# Patient Record
Sex: FEMALE
[De-identification: names, ages, dates, MRNs, and addresses within clinical notes are randomized per-mention and may not be internally consistent; named-entity substitution may affect disease eponyms.]

---

## 2022-07-07 ENCOUNTER — APPOINTMENT (OUTPATIENT)
Dept: RHEUMATOLOGY | Facility: CLINIC | Age: 77
End: 2022-07-07

## 2022-07-07 VITALS
RESPIRATION RATE: 18 BRPM | BODY MASS INDEX: 35.03 KG/M2 | HEIGHT: 66 IN | WEIGHT: 218 LBS | TEMPERATURE: 97 F | HEART RATE: 64 BPM | SYSTOLIC BLOOD PRESSURE: 129 MMHG | DIASTOLIC BLOOD PRESSURE: 85 MMHG | OXYGEN SATURATION: 97 %

## 2022-07-07 PROCEDURE — 99205 OFFICE O/P NEW HI 60 MIN: CPT

## 2022-07-07 NOTE — ASSESSMENT
[FreeTextEntry1] : 78 y/o female referred to rheumatology for RA.\par Pt was diagnosed with RA around 2016 with joint pains in shoulders, ankles, feet, feet.\par Pt was on PDN and MTX in the past but stopped when started on injectables. Pt has been on Actemra for the last 4-5 years.\par Pt had R knee patella was removed at age 21 for recurrent knee swelling, which has since resolved with surgery.\par Pt has been following with Dr. Courtney and he stopped taking her insurance. Pt's Actemra was stopped about 1 month ago due to no refills. Pt has been noticing more pains in the shoulders and R ankle swelling. Pt was seen by another rheumatologist but was told that she was not seropositive.\par Pt also follows with hematology for iron overload, s/p phlebotomy x 1.\par Pt follows with pain management for back pain and receives multiple injections.\par \par Patient reportedly has seronegative RA, failed MTX, and has been on Actemra for the last 4-5 years with good control. Pt wonders if she needs to be continued on injectable medications as she is afraid of side effects and whether she needs this medication for a seronegative disease.\par I discussed with the patient that seronegativity can indicate less aggressive disease, but other factors, including disease activity, bone damage, etc. are also important in determining medication choices. Given that MTX did not fully control her symptoms but Actemra has been for the last 4 years, I do feel that she should be continuing on Actemra and not changing medication that is keeping her disease stable.\par \par - Advised to send me lab results from pt's rheumatologist over the last 2 years\par - If Hep B/C and TB testing negative, will renew pt's Actemra. Side effects of Actemra were discussed with the pt in detail including increased risk of infection, GI perforation, hepatotoxicity, neutropenia, thrombocytopenia. Advised pt to seek medical care ASAP if he/she has an infection and advised to stop the medication until infection resolves. This is a high-risk therapy requiring intense monitoring for toxicity. Will evaluate with frequent monitoring of CBC, LFTs, lipid panel.\par - Obtain XR of b/l hands, ankles, feet to evaluate for erosions\par - Will call pt to let her know if I am renewing Actemra. Will discuss my impression of pt's disease activity and prognosis based on above data and talk about risk and benefits of keeping or changing Actemra to a different medication. RTC 2 months for follow up

## 2022-07-07 NOTE — HISTORY OF PRESENT ILLNESS
Final Anesthesia Post-op Assessment    Patient: Jennifer Holm  Procedure(s) Performed: COLONOSCOPY  Anesthesia type: MAC    Vitals Value Taken Time   Temp 36 01/21/21 1113   Pulse 60 01/21/21 1113   Resp 18 01/21/21 1113   SpO2 99 % 01/21/21 1113   BP 91/51 01/21/21 1113   Vitals shown include unvalidated device data.      Patient Location: PACU Phase 1  Post-op Vital Signs:stable  Level of Consciousness: awake, alert, participates in exam and oriented  Respiratory Status: spontaneous ventilation and nasal cannula  Cardiovascular stable  Hydration: euvolemic  Pain Management: adequately controlled  Handoff: Handoff to receiving nurse was performed and questions were answered  Vomiting: none   Nausea: None  Airway Patency:patent  Post-op Assessment: awake, alert, appropriately conversant, or baseline, no complications, patient tolerated procedure well with no complications, evidence of recall, dentition within defined limits, moving all extremities and No Corneal Abrasion      No complications documented.    [FreeTextEntry1] : 78 y/o female referred to rheumatology for RA.\par Pt was diagnosed with RA around 2016 with joint pains in shoulders, ankles, feet, feet.\par Pt was on PDN and MTX in the past but stopped when started on injectables. Pt has been on Actemra for the last 4-5 years.\par Pt had R knee patella was removed at age 21 for recurrent knee swelling, which has since resolved with surgery.\par Pt has been following with Dr. Courtney and he stopped taking her insurance. Pt's Actemra was stopped about 1 month ago due to no refills. Pt has been noticing more pains in the shoulders and R ankle swelling. Pt was seen by another rheumatologist but was told that she was not seropositive.\par Pt also follows with hematology for iron overload, s/p phlebotomy x 1.\par Pt follows with pain management for back pain and receives multiple injections.\par

## 2022-07-29 ENCOUNTER — NON-APPOINTMENT (OUTPATIENT)
Age: 77
End: 2022-07-29

## 2022-09-07 ENCOUNTER — APPOINTMENT (OUTPATIENT)
Dept: RHEUMATOLOGY | Facility: CLINIC | Age: 77
End: 2022-09-07

## 2022-09-07 VITALS
OXYGEN SATURATION: 96 % | SYSTOLIC BLOOD PRESSURE: 131 MMHG | BODY MASS INDEX: 32.14 KG/M2 | HEIGHT: 66 IN | DIASTOLIC BLOOD PRESSURE: 80 MMHG | HEART RATE: 52 BPM | WEIGHT: 200 LBS | TEMPERATURE: 96.9 F

## 2022-09-07 PROCEDURE — 99214 OFFICE O/P EST MOD 30 MIN: CPT

## 2022-09-07 NOTE — HISTORY OF PRESENT ILLNESS
[FreeTextEntry1] : HISTORY: \par 78 y/o female presents as follow up for RA. \par Pt was diagnosed with RA around 2016 with joint pains in shoulders, ankles, feet. Pt has positive CCP. \par Pt was on PDN and MTX in the past but stopped when started on biologics. Pt has been on Actemra for the last 4-5 years. \par Pt had R knee patella was removed at age 21 for recurrent knee swelling, which has since resolved with surgery. \par Pt has been following with Dr. Courtney and he stopped taking her insurance. Pt's Actemra was stopped about 1 month ago as pt’s symptoms not clearly improved with Actemra and pt wanted to see if she can be off medications. Pt has been noticing more pains in the shoulders and R ankle swelling. Pt was seen by another rheumatologist but was told that she was not seropositive. \par Pt also follows with hematology for iron overload, s/p phlebotomy x 1. \par Pt follows with pain management for back pain and receives multiple injections. \par \par Patient reportedly has seronegative RA, failed MTX, and has been on Actemra for the last 4-5 years with good control. Pt wonders if she needs to be continued on injectable medications as she is afraid of side effects and whether she needs this medication for a seronegative disease. \par \par I discussed with the patient that seronegativity can indicate less aggressive disease, but other factors, including disease activity, bone damage, etc. are also important in determining medication choices. Given that MTX did not fully control her symptoms but Actemra has been for the last 4 years, I do feel that she should be continuing on Actemra and not changing medication that is keeping her disease stable.\par \par INTERVAL HISTORY:\par Pt was restarted on Actemra by me about 2-3 weeks ago after negative safety labs. Pt denies any side effects. Denies any flares of joint pain or inflammation. 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

## 2022-09-07 NOTE — ASSESSMENT
[FreeTextEntry1] : 78 y/o female presents as follow up for RA. \par Pt was diagnosed with RA around 2016 with joint pains in shoulders, ankles, feet. Pt has positive CCP. \par Pt was on PDN and MTX in the past but stopped when started on biologics. Pt has been on Actemra for the last 4-5 years. \par Pt had R knee patella was removed at age 21 for recurrent knee swelling, which has since resolved with surgery. \par Pt has been following with Dr. Courtney and he stopped taking her insurance. Pt's Actemra was stopped about 1 month ago as pt’s symptoms not clearly improved with Actemra and pt wanted to see if she can be off medications. Pt has been noticing more pains in the shoulders and R ankle swelling. Pt was seen by another rheumatologist but was told that she was not seropositive. \par Pt also follows with hematology for iron overload, s/p phlebotomy x 1. \par Pt follows with pain management for back pain and receives multiple injections. \par \par Workup by me showed positive RF and CCP but normal inflammatory markers.\par Patient has RA, failed MTX, and has been on Actemra for the last 4-5 years with good control. Pt wonders if she needs to be continued on injectable medications as she is afraid of side effects. Given that MTX did not fully control her symptoms but Actemra has been for the last 4 years, I do feel that she should be continuing on Actemra and not changing medication that is keeping her disease stable.\par \par Pt was restarted on Actemra by me in 8/2022 without any major side effects.\par \par - Will obtain Actemra safety labs on next visit\par - c/w Actemra SQ. Side effects of Actemra were discussed with the pt in detail including increased risk of infection, GI perforation, hepatotoxicity, neutropenia, thrombocytopenia. Advised pt to seek medical care ASAP if he/she has an infection and advised to stop the medication until infection resolves. This is a high-risk therapy requiring intense monitoring for toxicity. Will evaluate with frequent monitoring of CBC, LFTs, lipid panel (every 3 months). \par - RTC 3 months for follow up\par \par

## 2022-12-23 ENCOUNTER — APPOINTMENT (OUTPATIENT)
Dept: RHEUMATOLOGY | Facility: CLINIC | Age: 77
End: 2022-12-23

## 2023-01-11 ENCOUNTER — NON-APPOINTMENT (OUTPATIENT)
Age: 78
End: 2023-01-11

## 2023-01-30 ENCOUNTER — APPOINTMENT (OUTPATIENT)
Dept: RHEUMATOLOGY | Facility: CLINIC | Age: 78
End: 2023-01-30
Payer: MEDICARE

## 2023-01-30 VITALS
DIASTOLIC BLOOD PRESSURE: 83 MMHG | HEART RATE: 75 BPM | BODY MASS INDEX: 32.14 KG/M2 | SYSTOLIC BLOOD PRESSURE: 128 MMHG | TEMPERATURE: 97.7 F | OXYGEN SATURATION: 96 % | WEIGHT: 200 LBS | HEIGHT: 66 IN

## 2023-01-30 PROCEDURE — 99214 OFFICE O/P EST MOD 30 MIN: CPT

## 2023-01-30 RX ORDER — GABAPENTIN 100 MG/1
100 CAPSULE ORAL
Qty: 90 | Refills: 1 | Status: COMPLETED | COMMUNITY
Start: 2022-10-05 | End: 2023-01-30

## 2023-01-30 NOTE — HISTORY OF PRESENT ILLNESS
[FreeTextEntry1] : HISTORY: \par 76 y/o female presents as follow up for RA.  \par Pt was diagnosed with RA around 2016 with joint pains in shoulders, ankles, feet. Pt has positive CCP.  \par Pt was on PDN and MTX in the past but stopped when started on biologics. Pt has been on Actemra for the last 4-5 years.  \par Pt had R knee patella was removed at age 21 for recurrent knee swelling, which has since resolved with surgery. \par \par Pt has been following with Dr. Courtney and he stopped taking her insurance. Pt's Actemra was stopped about 1 month ago as pt’s symptoms not clearly improved with Actemra and pt wanted to see if she can be off medications. Pt has been noticing more pains in the shoulders and R ankle swelling. Pt was seen by another rheumatologist but was told that she was not seropositive.  \par Pt also follows with hematology for iron overload, s/p phlebotomy x 1.  \par Pt follows with pain management for back pain and receives multiple injections.  \par \par Workup by me showed positive RF and CCP but normal inflammatory markers. \par Patient has RA, failed MTX, and has been on Actemra for the last 4-5 years with good control. Pt wonders if she needs to be continued on injectable medications as she is afraid of side effects. Given that MTX did not fully control her symptoms but Actemra has been for the last 4 years, I do feel that she should be continuing on Actemra and not changing medication that is keeping her disease stable. \par \par Pt was restarted on Actemra by me in 8/2022 without any major side effects. \par \par INTERVAL HISTORY: \par Pt had COVID infection in 12/2022 and held Actemra and celebrex. Actemra and celebrex was restarted after about 3 weeks after her COVID symptoms near resolved. Pt took Paxlovid for the COVID infection which gave her a lot of side effects. Pt continues to have some fatigue but is stlowly resolving. 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

## 2023-01-30 NOTE — ASSESSMENT
[FreeTextEntry1] : 76 y/o female presents as follow up for RA.  \par Pt was diagnosed with RA around 2016 with joint pains in shoulders, ankles, feet. Pt has positive CCP.  \par Pt was on PDN and MTX in the past but stopped when started on biologics. Pt has been on Actemra for the last 4-5 years.  \par Pt had R knee patella was removed at age 21 for recurrent knee swelling, which has since resolved with surgery. \par \par Pt has been following with Dr. Courtney and he stopped taking her insurance. Pt's Actemra was stopped about 1 month ago as pt’s symptoms not clearly improved with Actemra and pt wanted to see if she can be off medications. Pt has been noticing more pains in the shoulders and R ankle swelling. Pt was seen by another rheumatologist but was told that she was not seropositive.  \par Pt also follows with hematology for iron overload, s/p phlebotomy x 1.  \par Pt follows with pain management for back pain and receives multiple injections.  \par \par Workup by me showed positive RF and CCP but normal inflammatory markers. \par Patient has RA, failed MTX, and has been on Actemra for the last 4-5 years with good control. Pt wonders if she needs to be continued on injectable medications as she is afraid of side effects. Given that MTX did not fully control her symptoms but Actemra has been for the last 4 years, I do feel that she should be continuing on Actemra and not changing medication that is keeping her disease stable. \par \par Pt was restarted on Actemra by me in 8/2022 without any major side effects.\par Pt held Actemra and celebrex due to COVID infection but is now back on both. Gabapentin was stsopped due to lack of help with pain and made pt feel woozy.\par \par - Obtain Actemra safety labs with next labwork with hematologist\par - c/w Actemra SQ. Side effects of Actemra were discussed with the pt in detail including increased risk of infection, GI perforation, hepatotoxicity, neutropenia, thrombocytopenia. Advised pt to seek medical care ASAP if he/she has an infection and advised to stop the medication until infection resolves. This is a high-risk therapy requiring intense monitoring for toxicity. Will evaluate with frequent monitoring of CBC, LFTs, lipid panel (every 3 months).  \par - c/w celebrex 200mg PO BID PRN (pt takes about once daily). I advised that the NSAID should be taken with food.  In addition while taking the prescribed NSAID, no over the counter or other NSAIDs should be used, such as ibuprofen (Motrin or Advil) or naproxen (Aleve) as this can cause stomach upset or other side effects.  If needed for fever or breakthrough pain Tylenol can be used.\par - RTC 3 months for follow up\par

## 2023-05-01 ENCOUNTER — APPOINTMENT (OUTPATIENT)
Dept: RHEUMATOLOGY | Facility: CLINIC | Age: 78
End: 2023-05-01
Payer: MEDICARE

## 2023-05-01 VITALS
HEART RATE: 54 BPM | HEIGHT: 66 IN | SYSTOLIC BLOOD PRESSURE: 113 MMHG | WEIGHT: 200 LBS | TEMPERATURE: 97.4 F | DIASTOLIC BLOOD PRESSURE: 78 MMHG | OXYGEN SATURATION: 97 % | BODY MASS INDEX: 32.14 KG/M2

## 2023-05-01 PROCEDURE — 99214 OFFICE O/P EST MOD 30 MIN: CPT

## 2023-05-01 NOTE — HISTORY OF PRESENT ILLNESS
[FreeTextEntry1] : HISTORY: \par 79 y/o female presents as follow up for RA.  \par Pt was diagnosed with RA around 2016 with joint pains in shoulders, ankles, feet. Pt has positive CCP.  \par Pt was on PDN and MTX in the past but stopped when started on biologics. Pt has been on Actemra for the last 4-5 years.  \par Pt had R knee patella was removed at age 21 for recurrent knee swelling, which has since resolved with surgery.  \par Pt has been following with Dr. Courtney and he stopped taking her insurance. Pt's Actemra was stopped about 1 month ago as pt’s symptoms not clearly improved with Actemra and pt wanted to see if she can be off medications. Pt has been noticing more pains in the shoulders and R ankle swelling. Pt was seen by another rheumatologist but was told that she was not seropositive.  \par Pt also follows with hematology for iron overload, s/p phlebotomy x 1.  \par Pt follows with pain management for back pain and receives multiple injections.  \par \par Workup by me showed positive RF and CCP but normal inflammatory markers.  \par Patient has RA, failed MTX, and has been on Actemra for the last 4-5 years with good control. Pt wonders if she needs to be continued on injectable medications as she is afraid of side effects. Given that MTX did not fully control her symptoms but Actemra has been for the last 4 years, I do feel that she should be continuing on Actemra and not changing medication that is keeping her disease stable.  \par \par Pt was restarted on Actemra by me in 8/2022 without any major side effects. Gabapentin was stopped due to lack of help with pain and made pt feel woozy. \par \par INTERVAL HISTORY: \par Pt had COVID infection in 12/2022 and since then, pt does not feel as well and has lost 10-15 lbs. Pt is continuing to follow with cardiologist for workup. Pt also had first episode of panic attack and has been started on anxiolytic with improvement. 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 \par

## 2023-05-01 NOTE — ASSESSMENT
[FreeTextEntry1] : 79 y/o female presents as follow up for RA.  \par Pt was diagnosed with RA around 2016 with joint pains in shoulders, ankles, feet. Pt has positive CCP.  \par Pt was on PDN and MTX in the past but stopped when started on biologics. Pt has been on Actemra for the last 4-5 years.  \par Pt had R knee patella was removed at age 21 for recurrent knee swelling, which has since resolved with surgery.  \par Pt has been following with Dr. Courtney and he stopped taking her insurance. Pt's Actemra was stopped about 1 month ago as pt’s symptoms not clearly improved with Actemra and pt wanted to see if she can be off medications. Pt has been noticing more pains in the shoulders and R ankle swelling. Pt was seen by another rheumatologist but was told that she was not seropositive.  \par Pt also follows with hematology for iron overload, s/p phlebotomy x 1.  \par Pt follows with pain management for back pain and receives multiple injections.  \par \par Workup by me showed positive RF and CCP but normal inflammatory markers.  \par Patient has RA, failed MTX, and has been on Actemra for the last 4-5 years with good control. Pt wonders if she needs to be continued on injectable medications as she is afraid of side effects. Given that MTX did not fully control her symptoms but Actemra has been for the last 4 years, I do feel that she should be continuing on Actemra and not changing medication that is keeping her disease stable.  \par \par Pt was restarted on Actemra by me in 8/2022 without any major side effects and good control of her RA. Gabapentin was stopped due to lack of help with pain and made pt feel woozy.\par \par Pt has some non-specific symptoms including malaise, weight loss, anxiety since COVD infection in 12/2022.\par \par - Pt states she just had labwork by hematologist - advised to send labwork ASAP, as last safety labwork for Actemra was in 8/2022. \par - c/w Actemra SQ. Side effects of Actemra were discussed with the pt in detail including increased risk of infection, GI perforation, hepatotoxicity, neutropenia, thrombocytopenia. Advised pt to seek medical care ASAP if he/she has an infection and advised to stop the medication until infection resolves. This is a high-risk therapy requiring intense monitoring for toxicity. Will evaluate with frequent monitoring of CBC, LFTs, lipid panel (every 3 months).  \par - c/w celebrex 200mg PO BID PRN (pt takes about once daily). I advised that the NSAID should be taken with food. In addition while taking the prescribed NSAID, no over the counter or other NSAIDs should be used, such as ibuprofen (Motrin or Advil) or naproxen (Aleve) as this can cause stomach upset or other side effects. If needed for fever or breakthrough pain Tylenol can be used. \par - Last Hep B/C, TB negative 8/2022. Will obtain on next visit if not repeated by then\par - RTC 3 months for follow up \par

## 2023-06-05 ENCOUNTER — OFFICE (OUTPATIENT)
Dept: URBAN - METROPOLITAN AREA CLINIC 1 | Facility: CLINIC | Age: 78
Setting detail: OPHTHALMOLOGY
End: 2023-06-05
Payer: MEDICARE

## 2023-06-05 DIAGNOSIS — H35.372: ICD-10-CM

## 2023-06-05 DIAGNOSIS — H43.392: ICD-10-CM

## 2023-06-05 DIAGNOSIS — H18.831: ICD-10-CM

## 2023-06-05 DIAGNOSIS — Z96.1: ICD-10-CM

## 2023-06-05 DIAGNOSIS — H25.89: ICD-10-CM

## 2023-06-05 DIAGNOSIS — H43.811: ICD-10-CM

## 2023-06-05 DIAGNOSIS — H40.013: ICD-10-CM

## 2023-06-05 DIAGNOSIS — H43.822: ICD-10-CM

## 2023-06-05 DIAGNOSIS — H26.493: ICD-10-CM

## 2023-06-05 DIAGNOSIS — H16.223: ICD-10-CM

## 2023-06-05 DIAGNOSIS — H18.503: ICD-10-CM

## 2023-06-05 PROCEDURE — 92014 COMPRE OPH EXAM EST PT 1/>: CPT | Performed by: OPHTHALMOLOGY

## 2023-06-05 PROCEDURE — 92250 FUNDUS PHOTOGRAPHY W/I&R: CPT | Performed by: OPHTHALMOLOGY

## 2023-06-05 ASSESSMENT — SPHEQUIV_DERIVED
OD_SPHEQUIV: -3.125
OD_SPHEQUIV: -3.125
OS_SPHEQUIV: 0.25
OS_SPHEQUIV: 0.25

## 2023-06-05 ASSESSMENT — REFRACTION_CURRENTRX
OD_AXIS: 081
OS_CYLINDER: 0.00
OD_CYLINDER: -0.50
OD_VPRISM_DIRECTION: SV
OD_OVR_VA: 20/
OS_VPRISM_DIRECTION: SV
OS_SPHERE: -0.25
OD_SPHERE: -2.00
OS_AXIS: 000
OS_OVR_VA: 20/

## 2023-06-05 ASSESSMENT — AXIALLENGTH_DERIVED
OD_AL: 25.2382
OD_AL: 25.2382
OS_AL: 23.912
OS_AL: 23.912

## 2023-06-05 ASSESSMENT — KERATOMETRY
METHOD_AUTO_MANUAL: AUTO
OD_K2POWER_DIOPTERS: 43.00
OD_AXISANGLE_DEGREES: 102
OD_K1POWER_DIOPTERS: 42.25
OS_AXISANGLE_DEGREES: 079
OS_K2POWER_DIOPTERS: 42.75
OS_K1POWER_DIOPTERS: 42.00

## 2023-06-05 ASSESSMENT — REFRACTION_MANIFEST
OD_CYLINDER: -1.25
OD_SPHERE: -2.50
OS_SPHERE: +0.50
OS_ADD: +2.50
OD_AXIS: 106
OS_AXIS: 076
OS_VA1: 20/NI
OD_ADD: +2.50
OD_VA1: 20/20
OS_CYLINDER: -0.50

## 2023-06-05 ASSESSMENT — REFRACTION_AUTOREFRACTION
OD_AXIS: 106
OS_CYLINDER: -0.50
OS_SPHERE: +0.50
OD_SPHERE: -2.50
OD_CYLINDER: -1.25
OS_AXIS: 076

## 2023-06-05 ASSESSMENT — DECREASING TEAR LAKE - SEVERITY SCORE
OS_DEC_TEARLAKE: 1+
OD_DEC_TEARLAKE: 1+

## 2023-06-05 ASSESSMENT — PACHYMETRY
OS_CT_UM: 577
OD_CT_CORRECTION: -2
OS_CT_CORRECTION: -2
OD_CT_UM: 576

## 2023-06-05 ASSESSMENT — CONFRONTATIONAL VISUAL FIELD TEST (CVF)
OD_FINDINGS: FULL
OS_FINDINGS: FULL

## 2023-06-05 ASSESSMENT — VISUAL ACUITY
OD_BCVA: 20/25+2
OS_BCVA: 20/25

## 2023-06-05 ASSESSMENT — TONOMETRY
OD_IOP_MMHG: 15
OS_IOP_MMHG: 13

## 2023-08-01 ENCOUNTER — APPOINTMENT (OUTPATIENT)
Dept: RHEUMATOLOGY | Facility: CLINIC | Age: 78
End: 2023-08-01
Payer: MEDICARE

## 2023-08-01 PROCEDURE — 99214 OFFICE O/P EST MOD 30 MIN: CPT

## 2023-08-01 NOTE — PHYSICAL EXAM
[TextEntry] : GENERAL: Appears in no acute distress HEENT: EOMI, PERRLA. No conjunctival erythema. Moist mucous membranes. No nasopharyngeal ulcers NECK: Supple, no cervical lymphadenopathy, no thyromegaly CARDIOVASCULAR: RRR. S1, S2 auscultated. No murmurs or rubs. PULMONARY: Clear to auscultation b/l, no wheezes, rales, or crackles ABDOMINAL: Soft, nontender, nondistended. Bowel sounds present. No organomegaly. MSK: No active synovitis No tenderness to palpation of joints No chronic RA deformities. Normal ROM of neck, back, b/l upper and lower extremities. SKIN: No lesions or rashes NEURO: No focal deficits. PSYCH: AAOx3. Normal affect and thought process.

## 2023-08-01 NOTE — ASSESSMENT
[FreeTextEntry1] : 79 y/o female presents as follow up for RA.   Pt was diagnosed with RA around 2016 with joint pains in shoulders, ankles, feet. Pt has positive CCP.   Pt was on PDN and MTX in the past but stopped when started on biologics. Pt has been on Actemra for the last 4-5 years.   Pt had R knee patella was removed at age 21 for recurrent knee swelling, which has since resolved with surgery.   Pt has been following with Dr. Courtney and he stopped taking her insurance. Pt's Actemra was stopped about 1 month ago as pt's symptoms not clearly improved with Actemra and pt wanted to see if she can be off medications. Pt has been noticing more pains in the shoulders and R ankle swelling. Pt was seen by another rheumatologist but was told that she was not seropositive.   Pt also follows with hematology for iron overload, s/p phlebotomy x 1.   Pt follows with pain management for back pain and receives multiple injections.    Workup by me showed positive RF and CCP but normal inflammatory markers.   Patient has RA, failed MTX, and has been on Actemra for the last 4-5 years with good control. Pt wonders if she needs to be continued on injectable medications as she is afraid of side effects. Given that MTX did not fully control her symptoms but Actemra has been for the last 4 years, I do feel that she should be continuing on Actemra and not changing medication that is keeping her disease stable.    Pt was restarted on Actemra by me in 8/2022 without any major side effects and good control of her RA. Gabapentin was stopped due to lack of help with pain and made pt feel woozy.   Pt has some non-specific symptoms including malaise, weight loss, anxiety since COVD infection in 12/2022.   - Obtain labwork for medication safety and disease activity, annual Hep B/C, Quantiferon TB - c/w Actemra SQ. Side effects of Actemra were discussed with the pt in detail including increased risk of infection, GI perforation, hepatotoxicity, neutropenia, thrombocytopenia. Advised pt to seek medical care ASAP if he/she has an infection and advised to stop the medication until infection resolves. This is a high-risk therapy requiring intense monitoring for toxicity. Will evaluate with frequent monitoring of CBC, LFTs, lipid panel (every 3 months). - c/w celebrex 200mg PO BID PRN (pt takes about once daily). I advised that the NSAID should be taken with food. In addition while taking the prescribed NSAID, no over the counter or other NSAIDs should be used, such as ibuprofen (Motrin or Advil) or naproxen (Aleve) as this can cause stomach upset or other side effects. If needed for fever or breakthrough pain Tylenol can be used. - Last Hep B/C, TB negative 8/2022. Repeating today. - RTC 3 months for follow up

## 2023-08-01 NOTE — HISTORY OF PRESENT ILLNESS
[FreeTextEntry1] : HISTORY:  77 y/o female presents as follow up for RA.   Pt was diagnosed with RA around 2016 with joint pains in shoulders, ankles, feet. Pt has positive CCP.   Pt was on PDN and MTX in the past but stopped when started on biologics. Pt has been on Actemra for the last 4-5 years.   Pt had R knee patella was removed at age 21 for recurrent knee swelling, which has since resolved with surgery.   Pt has been following with Dr. Courtney and he stopped taking her insurance. Pt's Actemra was stopped about 1 month ago as pt's symptoms not clearly improved with Actemra and pt wanted to see if she can be off medications. Pt has been noticing more pains in the shoulders and R ankle swelling. Pt was seen by another rheumatologist but was told that she was not seropositive.   Pt also follows with hematology for iron overload, s/p phlebotomy x 1.   Pt follows with pain management for back pain and receives multiple injections.    Workup by me showed positive RF and CCP but normal inflammatory markers.   Patient has RA, failed MTX, and has been on Actemra for the last 4-5 years with good control. Pt wonders if she needs to be continued on injectable medications as she is afraid of side effects. Given that MTX did not fully control her symptoms but Actemra has been for the last 4 years, I do feel that she should be continuing on Actemra and not changing medication that is keeping her disease stable.    Pt was restarted on Actemra by me in 8/2022 without any major side effects and good control of her RA. Gabapentin was stopped due to lack of help with pain and made pt feel woozy.   Pt has some non-specific symptoms including malaise, weight loss, anxiety since COVD infection in 12/2022.   INTERVAL HISTORY:  Pt continues on Actemra with good control of her RA.  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

## 2023-11-07 ENCOUNTER — APPOINTMENT (OUTPATIENT)
Dept: RHEUMATOLOGY | Facility: CLINIC | Age: 78
End: 2023-11-07
Payer: MEDICARE

## 2023-11-07 VITALS
BODY MASS INDEX: 32.14 KG/M2 | WEIGHT: 200 LBS | TEMPERATURE: 94.5 F | HEIGHT: 66 IN | DIASTOLIC BLOOD PRESSURE: 74 MMHG | OXYGEN SATURATION: 96 % | HEART RATE: 62 BPM | SYSTOLIC BLOOD PRESSURE: 120 MMHG

## 2023-11-07 PROCEDURE — 99214 OFFICE O/P EST MOD 30 MIN: CPT

## 2023-11-07 RX ORDER — CELECOXIB 200 MG/1
200 CAPSULE ORAL
Qty: 180 | Refills: 1 | Status: DISCONTINUED | COMMUNITY
Start: 2022-10-05 | End: 2023-11-07

## 2023-11-22 PROBLEM — H35.3121 AGE RELATED MACULAR DEGENERATION DRY; RIGHT EYE EARLY, LEFT EYE EARLY: Status: ACTIVE | Noted: 2023-11-22

## 2023-11-22 PROBLEM — H35.3111 AGE RELATED MACULAR DEGENERATION DRY; RIGHT EYE EARLY, LEFT EYE EARLY: Status: ACTIVE | Noted: 2023-11-22

## 2023-11-22 PROBLEM — H35.3131 AGE RELATED MACULAR DEGENERATION DRY; RIGHT EYE EARLY, LEFT EYE EARLY: Status: ACTIVE | Noted: 2023-11-22

## 2023-12-04 ENCOUNTER — OFFICE (OUTPATIENT)
Dept: URBAN - METROPOLITAN AREA CLINIC 1 | Facility: CLINIC | Age: 78
Setting detail: OPHTHALMOLOGY
End: 2023-12-04
Payer: MEDICARE

## 2023-12-04 DIAGNOSIS — H18.503: ICD-10-CM

## 2023-12-04 DIAGNOSIS — H25.89: ICD-10-CM

## 2023-12-04 DIAGNOSIS — H53.2: ICD-10-CM

## 2023-12-04 DIAGNOSIS — Z96.1: ICD-10-CM

## 2023-12-04 DIAGNOSIS — H16.223: ICD-10-CM

## 2023-12-04 DIAGNOSIS — H40.013: ICD-10-CM

## 2023-12-04 DIAGNOSIS — H18.831: ICD-10-CM

## 2023-12-04 PROBLEM — H43.811 POSTERIOR VITREOUS DETACHMENT ; RIGHT EYE: Status: ACTIVE | Noted: 2023-12-04

## 2023-12-04 PROCEDURE — 92133 CPTRZD OPH DX IMG PST SGM ON: CPT | Performed by: OPHTHALMOLOGY

## 2023-12-04 PROCEDURE — 99213 OFFICE O/P EST LOW 20 MIN: CPT | Performed by: OPHTHALMOLOGY

## 2023-12-04 PROCEDURE — 92083 EXTENDED VISUAL FIELD XM: CPT | Performed by: OPHTHALMOLOGY

## 2023-12-04 ASSESSMENT — DECREASING TEAR LAKE - SEVERITY SCORE
OS_DEC_TEARLAKE: 1+
OD_DEC_TEARLAKE: 1+

## 2023-12-04 ASSESSMENT — REFRACTION_MANIFEST
OS_CYLINDER: -0.50
OD_SPHERE: -2.50
OD_VA1: 20/20
OS_AXIS: 076
OD_AXIS: 106
OD_CYLINDER: -1.25
OS_ADD: +2.50
OS_SPHERE: +0.50
OS_VA1: 20/NI
OD_ADD: +2.50

## 2023-12-04 ASSESSMENT — REFRACTION_AUTOREFRACTION
OS_AXIS: 053
OD_AXIS: 098
OS_SPHERE: 0.00
OD_CYLINDER: -1.50
OD_SPHERE: -2.25
OS_CYLINDER: -0.25

## 2023-12-04 ASSESSMENT — REFRACTION_CURRENTRX
OD_CYLINDER: -0.50
OD_AXIS: 081
OS_CYLINDER: 0.00
OS_SPHERE: -0.25
OD_OVR_VA: 20/
OS_AXIS: 000
OS_OVR_VA: 20/
OD_SPHERE: -2.00
OD_VPRISM_DIRECTION: SV
OS_VPRISM_DIRECTION: SV

## 2023-12-04 ASSESSMENT — SPHEQUIV_DERIVED
OS_SPHEQUIV: 0.25
OD_SPHEQUIV: -3.125
OD_SPHEQUIV: -3
OS_SPHEQUIV: -0.125

## 2023-12-04 ASSESSMENT — CONFRONTATIONAL VISUAL FIELD TEST (CVF)
OS_FINDINGS: FULL
OD_FINDINGS: FULL

## 2024-03-15 ENCOUNTER — APPOINTMENT (OUTPATIENT)
Dept: RHEUMATOLOGY | Facility: CLINIC | Age: 79
End: 2024-03-15
Payer: MEDICARE

## 2024-03-15 VITALS
BODY MASS INDEX: 28.93 KG/M2 | TEMPERATURE: 97.3 F | DIASTOLIC BLOOD PRESSURE: 81 MMHG | WEIGHT: 180 LBS | HEART RATE: 59 BPM | SYSTOLIC BLOOD PRESSURE: 122 MMHG | OXYGEN SATURATION: 94 % | HEIGHT: 66 IN

## 2024-03-15 DIAGNOSIS — M17.9 OSTEOARTHRITIS OF KNEE, UNSPECIFIED: ICD-10-CM

## 2024-03-15 PROCEDURE — G2211 COMPLEX E/M VISIT ADD ON: CPT

## 2024-03-15 PROCEDURE — 99214 OFFICE O/P EST MOD 30 MIN: CPT

## 2024-04-03 NOTE — ASSESSMENT
[FreeTextEntry1] : 80 y/o female presents as follow up for RA.  Pt was diagnosed with RA around 2016 with joint pains in shoulders, ankles, feet. Pt has positive CCP.  Pt was on PDN and MTX in the past but stopped when started on biologics. Pt has been on Actemra for the last 4-5 years.  Pt had R knee patella was removed at age 21 for recurrent knee swelling, which has since resolved with surgery.  Pt has been following with Dr. Courtney and he stopped taking her insurance. Pt's Actemra was stopped about 1 month ago as pt's symptoms not clearly improved with Actemra and pt wanted to see if she can be off medications. Pt has been noticing more pains in the shoulders and R ankle swelling. Pt was seen by another rheumatologist but was told that she was not seropositive.  Pt also follows with hematology for iron overload, s/p phlebotomy x 1.  Pt follows with pain management for back pain and receives multiple injections.   Workup by me showed positive RF and CCP but normal inflammatory markers.  Patient has RA, failed MTX, and has been on Actemra for the last 4-5 years with good control. Pt wonders if she needs to be continued on injectable medications as she is afraid of side effects. Given that MTX did not fully control her symptoms but Actemra has been for the last 4 years, I do feel that she should be continuing on Actemra and not changing medication that is keeping her disease stable.   Pt was restarted on Actemra by me in 8/2022 without any major side effects and good control of her RA. Gabapentin was stopped due to lack of help with pain and made pt feel woozy.  Pt continues on Actemra [id number], celebrex ~1x/day and Advil ~1x/day without RA flares and generally good control of her joint pains.   - Obtain labwork for medication safety and disease activity - c/w Actemra SQ [id number]. Side effects of Actemra were discussed with the pt in detail including increased risk of infection, GI perforation, hepatotoxicity, neutropenia, thrombocytopenia. Advised pt to seek medical care ASAP if he/she has an infection and advised to stop the medication until infection resolves. This is a high-risk therapy requiring intense monitoring for toxicity. Will evaluate with frequent monitoring of CBC, LFTs, lipid panel (every 3 months). - Can consider decreasing frequency in the future if RA well controlled for a long time. - c/w celebrex 200mg PO daily (pt takes Advil in the afternoon). I advised that the NSAID should be taken with food. In addition while taking the prescribed NSAID, no over the counter or other NSAIDs should be used, such as ibuprofen (Motrin or Advil) or naproxen (Aleve) as this can cause stomach upset or other side effects. If needed for fever or breakthrough pain Tylenol can be used. - Last Hep B/C, TB negative 8/2023 - RTC 3 months for follow up.

## 2024-04-03 NOTE — HISTORY OF PRESENT ILLNESS
[FreeTextEntry1] : HISTORY:  78 y/o female presents as follow up for RA.  Pt was diagnosed with RA around 2016 with joint pains in shoulders, ankles, feet. Pt has positive CCP.  Pt was on PDN and MTX in the past but stopped when started on biologics. Pt has been on Actemra for the last 4-5 years.  Pt had R knee patella was removed at age 21 for recurrent knee swelling, which has since resolved with surgery.  Pt has been following with Dr. Courtney and he stopped taking her insurance. Pt's Actemra was stopped about 1 month ago as pt's symptoms not clearly improved with Actemra and pt wanted to see if she can be off medications. Pt has been noticing more pains in the shoulders and R ankle swelling. Pt was seen by another rheumatologist but was told that she was not seropositive.  Pt also follows with hematology for iron overload, s/p phlebotomy x 1.  Pt follows with pain management for back pain and receives multiple injections.   Workup by me showed positive RF and CCP but normal inflammatory markers.  Patient has RA, failed MTX, and has been on Actemra for the last 4-5 years with good control. Pt wonders if she needs to be continued on injectable medications as she is afraid of side effects. Given that MTX did not fully control her symptoms but Actemra has been for the last 4 years, I do feel that she should be continuing on Actemra and not changing medication that is keeping her disease stable.   Pt was restarted on Actemra by me in 8/2022 without any major side effects and good control of her RA. Gabapentin was stopped due to lack of help with pain and made pt feel woozy.  Pt continues on Actemra, celebrex ~1x/day and Advil ~1x/day without RA flares and generally good control of her joint pains.   INTERVAL HISTORY:  Pt continues on Actemra !2Weeks, celebrex QAM, Advil QPM with good control of her RA. Last labs 11/2023 normal.  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

## 2024-04-12 ENCOUNTER — OFFICE (OUTPATIENT)
Dept: URBAN - METROPOLITAN AREA CLINIC 1 | Facility: CLINIC | Age: 79
Setting detail: OPHTHALMOLOGY
End: 2024-04-12
Payer: MEDICARE

## 2024-04-12 DIAGNOSIS — H00.021: ICD-10-CM

## 2024-04-12 PROCEDURE — 99213 OFFICE O/P EST LOW 20 MIN: CPT

## 2024-04-26 ENCOUNTER — OFFICE (OUTPATIENT)
Dept: URBAN - METROPOLITAN AREA CLINIC 1 | Facility: CLINIC | Age: 79
Setting detail: OPHTHALMOLOGY
End: 2024-04-26
Payer: MEDICARE

## 2024-04-26 DIAGNOSIS — H16.223: ICD-10-CM

## 2024-04-26 DIAGNOSIS — H52.13: ICD-10-CM

## 2024-04-26 PROBLEM — H52.7 REFRACTIVE ERROR: Status: ACTIVE | Noted: 2024-04-26

## 2024-04-26 PROBLEM — H00.021 HORDEOLUM INTERNUM; RIGHT UPPER LID: Status: RESOLVED | Noted: 2024-04-12 | Resolved: 2024-04-26

## 2024-04-26 PROCEDURE — 92015 DETERMINE REFRACTIVE STATE: CPT

## 2024-04-26 PROCEDURE — 99213 OFFICE O/P EST LOW 20 MIN: CPT

## 2024-05-21 ENCOUNTER — RX RENEWAL (OUTPATIENT)
Age: 79
End: 2024-05-21

## 2024-05-21 RX ORDER — CELECOXIB 200 MG/1
200 CAPSULE ORAL
Qty: 180 | Refills: 0 | Status: ACTIVE | COMMUNITY
Start: 2023-12-21 | End: 1900-01-01

## 2024-06-03 ENCOUNTER — OFFICE (OUTPATIENT)
Dept: URBAN - METROPOLITAN AREA CLINIC 1 | Facility: CLINIC | Age: 79
Setting detail: OPHTHALMOLOGY
End: 2024-06-03
Payer: MEDICARE

## 2024-06-03 DIAGNOSIS — H18.831: ICD-10-CM

## 2024-06-03 DIAGNOSIS — H35.372: ICD-10-CM

## 2024-06-03 DIAGNOSIS — H18.503: ICD-10-CM

## 2024-06-03 DIAGNOSIS — H43.811: ICD-10-CM

## 2024-06-03 DIAGNOSIS — H16.223: ICD-10-CM

## 2024-06-03 DIAGNOSIS — H53.2: ICD-10-CM

## 2024-06-03 DIAGNOSIS — H40.013: ICD-10-CM

## 2024-06-03 DIAGNOSIS — Z96.1: ICD-10-CM

## 2024-06-03 DIAGNOSIS — H25.89: ICD-10-CM

## 2024-06-03 DIAGNOSIS — H43.822: ICD-10-CM

## 2024-06-03 DIAGNOSIS — H43.392: ICD-10-CM

## 2024-06-03 DIAGNOSIS — H26.493: ICD-10-CM

## 2024-06-03 PROCEDURE — 92014 COMPRE OPH EXAM EST PT 1/>: CPT | Performed by: OPHTHALMOLOGY

## 2024-06-03 PROCEDURE — 92250 FUNDUS PHOTOGRAPHY W/I&R: CPT | Performed by: OPHTHALMOLOGY

## 2024-06-03 ASSESSMENT — CONFRONTATIONAL VISUAL FIELD TEST (CVF)
OD_FINDINGS: FULL
OS_FINDINGS: FULL

## 2024-06-14 PROBLEM — H35.3131 AGE RELATED MACULAR DEGENERATION DRY; RIGHT EYE EARLY, LEFT EYE EARLY: Status: ACTIVE | Noted: 2024-06-14

## 2024-06-14 PROBLEM — H35.3111 AGE RELATED MACULAR DEGENERATION DRY; RIGHT EYE EARLY, LEFT EYE EARLY: Status: ACTIVE | Noted: 2024-06-14

## 2024-06-14 PROBLEM — H35.3121 AGE RELATED MACULAR DEGENERATION DRY; RIGHT EYE EARLY, LEFT EYE EARLY: Status: ACTIVE | Noted: 2024-06-14

## 2024-06-17 ENCOUNTER — APPOINTMENT (OUTPATIENT)
Dept: RHEUMATOLOGY | Facility: CLINIC | Age: 79
End: 2024-06-17
Payer: MEDICARE

## 2024-06-17 VITALS
OXYGEN SATURATION: 98 % | BODY MASS INDEX: 29.73 KG/M2 | SYSTOLIC BLOOD PRESSURE: 109 MMHG | DIASTOLIC BLOOD PRESSURE: 72 MMHG | HEART RATE: 56 BPM | WEIGHT: 185 LBS | TEMPERATURE: 97.4 F | HEIGHT: 66 IN

## 2024-06-17 DIAGNOSIS — Z79.899 ENCOUNTER FOR THERAPEUTIC DRUG LVL MONITORING: ICD-10-CM

## 2024-06-17 DIAGNOSIS — Z51.81 ENCOUNTER FOR THERAPEUTIC DRUG LVL MONITORING: ICD-10-CM

## 2024-06-17 DIAGNOSIS — M06.9 RHEUMATOID ARTHRITIS, UNSPECIFIED: ICD-10-CM

## 2024-06-17 PROCEDURE — G2211 COMPLEX E/M VISIT ADD ON: CPT

## 2024-06-17 PROCEDURE — 99214 OFFICE O/P EST MOD 30 MIN: CPT

## 2024-06-17 RX ORDER — TOCILIZUMAB 180 MG/ML
162 INJECTION, SOLUTION SUBCUTANEOUS
Qty: 2 | Refills: 2 | Status: ACTIVE | COMMUNITY
Start: 2022-08-16 | End: 1900-01-01

## 2024-06-17 NOTE — HISTORY OF PRESENT ILLNESS
[FreeTextEntry1] : HISTORY:  78 y/o female presents as follow up for RA.  Pt was diagnosed with RA around 2016 with joint pains in shoulders, ankles, feet. Pt has positive CCP.  Pt was on PDN and MTX in the past but stopped when started on biologics. Pt has been on Actemra for the last 4-5 years.  Pt had R knee patella was removed at age 21 for recurrent knee swelling, which has since resolved with surgery.  Pt has been following with Dr. Courtney and he stopped taking her insurance. Pt's Actemra was stopped about 1 month ago as pt's symptoms not clearly improved with Actemra and pt wanted to see if she can be off medications. Pt has been noticing more pains in the shoulders and R ankle swelling. Pt was seen by another rheumatologist but was told that she was not seropositive.  Pt also follows with hematology for iron overload, s/p phlebotomy x 1.  Pt follows with pain management for back pain and receives multiple injections.   Workup by me showed positive RF and CCP but normal inflammatory markers.  Patient has RA, failed MTX, and has been on Actemra for the last 4-5 years with good control. Pt wonders if she needs to be continued on injectable medications as she is afraid of side effects. Given that MTX did not fully control her symptoms but Actemra has been for the last 4 years, I do feel that she should be continuing on Actemra and not changing medication that is keeping her disease stable.   Pt was restarted on Actemra by me in 8/2022 without any major side effects and good control of her RA. Gabapentin was stopped due to lack of help with pain and made pt feel woozy.  Pt continues on Actemra [id number], celebrex ~1x/day and Advil ~1x/day without RA flares and generally good control of her joint pains.   INTERVAL HISTORY:  Pt continues Actemra [id number], celebrex, Advil daily without any major flares of RA. Pt continues to have significant mechanical low back pain for which she follows with pain management for injections and procedures.  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

## 2024-06-17 NOTE — ASSESSMENT
[FreeTextEntry1] : 80 y/o female presents as follow up for RA.  Pt was diagnosed with RA around 2016 with joint pains in shoulders, ankles, feet. Pt has positive CCP.  Pt was on PDN and MTX in the past but stopped when started on biologics. Pt has been on Actemra for the last 4-5 years.  Pt had R knee patella was removed at age 21 for recurrent knee swelling, which has since resolved with surgery.  Pt has been following with Dr. Courtney and he stopped taking her insurance. Pt's Actemra was stopped about 1 month ago as pt's symptoms not clearly improved with Actemra and pt wanted to see if she can be off medications. Pt has been noticing more pains in the shoulders and R ankle swelling. Pt was seen by another rheumatologist but was told that she was not seropositive.  Pt also follows with hematology for iron overload, s/p phlebotomy x 1.  Pt follows with pain management for back pain and receives multiple injections.   Workup by me showed positive RF and CCP but normal inflammatory markers.  Patient has RA, failed MTX, and has been on Actemra for the last 4-5 years with good control. Pt wonders if she needs to be continued on injectable medications as she is afraid of side effects. Given that MTX did not fully control her symptoms but Actemra has been for the last 4 years, I do feel that she should be continuing on Actemra and not changing medication that is keeping her disease stable.   Pt was restarted on Actemra by me in 8/2022 without any major side effects and good control of her RA. Gabapentin was stopped due to lack of help with pain and made pt feel woozy.  Pt continues on Actemra [id number], celebrex ~1x/day and Advil ~1x/day without RA flares and generally good control of her joint pains.  Pt continues to have significant mechanical low back pain for which she follows with pain management for injections and procedures.  - Pt reports recent labwork by PCP, will send me results. - c/w Actemra SQ [id number]. Side effects of Actemra were discussed with the pt in detail including increased risk of infection, GI perforation, hepatotoxicity, neutropenia, thrombocytopenia. Advised pt to seek medical care ASAP if he/she has an infection and advised to stop the medication until infection resolves. This is a high-risk therapy requiring intense monitoring for toxicity. Will evaluate with frequent monitoring of CBC, LFTs, lipid panel (every 3 months).  - c/w celebrex 200mg PO daily (pt takes Advil in the afternoon). I advised that the NSAID should be taken with food. In addition while taking the prescribed NSAID, no over the counter or other NSAIDs should be used, such as ibuprofen (Motrin or Advil) or naproxen (Aleve) as this can cause stomach upset or other side effects. If needed for fever or breakthrough pain Tylenol can be used.  - Last Hep B/C, TB negative 8/2023  - RTC 3 months for follow up.

## 2024-09-17 ENCOUNTER — APPOINTMENT (OUTPATIENT)
Dept: RHEUMATOLOGY | Facility: CLINIC | Age: 79
End: 2024-09-17
Payer: MEDICARE

## 2024-09-17 VITALS — SYSTOLIC BLOOD PRESSURE: 120 MMHG | HEART RATE: 60 BPM | OXYGEN SATURATION: 98 % | DIASTOLIC BLOOD PRESSURE: 82 MMHG

## 2024-09-17 DIAGNOSIS — Z51.81 ENCOUNTER FOR THERAPEUTIC DRUG LVL MONITORING: ICD-10-CM

## 2024-09-17 DIAGNOSIS — Z79.899 ENCOUNTER FOR THERAPEUTIC DRUG LVL MONITORING: ICD-10-CM

## 2024-09-17 DIAGNOSIS — M17.9 OSTEOARTHRITIS OF KNEE, UNSPECIFIED: ICD-10-CM

## 2024-09-17 DIAGNOSIS — M06.9 RHEUMATOID ARTHRITIS, UNSPECIFIED: ICD-10-CM

## 2024-09-17 PROCEDURE — 99214 OFFICE O/P EST MOD 30 MIN: CPT

## 2024-09-17 PROCEDURE — G2211 COMPLEX E/M VISIT ADD ON: CPT

## 2024-09-17 NOTE — ASSESSMENT
[FreeTextEntry1] : 78 y/o female presents as follow up for RA.  Pt was diagnosed with RA around 2016 with joint pains in shoulders, ankles, feet. Pt has positive CCP.  Pt was on PDN and MTX in the past but stopped when started on biologics. Pt has been on Actemra for the last 4-5 years.  Pt had R knee patella was removed at age 21 for recurrent knee swelling, which has since resolved with surgery.  Pt has been following with Dr. Courtney and he stopped taking her insurance. Pt's Actemra was stopped about 1 month ago as pt's symptoms not clearly improved with Actemra and pt wanted to see if she can be off medications. Pt has been noticing more pains in the shoulders and R ankle swelling. Pt was seen by another rheumatologist but was told that she was not seropositive.  Pt also follows with hematology for iron overload, s/p phlebotomy x 1.  Pt follows with pain management for back pain and receives multiple injections.   Workup by me showed positive RF and CCP but normal inflammatory markers.  Patient has RA, failed MTX, and has been on Actemra for the last 4-5 years with good control. Pt wonders if she needs to be continued on injectable medications as she is afraid of side effects. Given that MTX did not fully control her symptoms but Actemra has been for the last 4 years, I do feel that she should be continuing on Actemra and not changing medication that is keeping her disease stable.   Pt was restarted on Actemra by me in 8/2022 without any major side effects and good control of her RA. Gabapentin was stopped due to lack of help with pain and made pt feel woozy.  Pt continues on Actemra [id number], celebrex ~1x/day and Advil ~1x/day without RA flares and generally good control of her joint pains.  Pt continues to have significant mechanical low back pain for which she follows with pain management for injections and procedures.   - Obtain safety labs including repeat Hep B/C, QTB (to be done with next labwork with her other doctors - pt gets labwork every 3 months). - c/w Actemra SQ [id number]. Side effects of Actemra were discussed with the pt in detail including increased risk of infection, GI perforation, hepatotoxicity, neutropenia, thrombocytopenia. Advised pt to seek medical care ASAP if he/she has an infection and advised to stop the medication until infection resolves. This is a high-risk therapy requiring intense monitoring for toxicity. Will evaluate with frequent monitoring of CBC, LFTs, lipid panel (every 3 months).  - c/w celebrex 200mg PO daily (pt takes Advil in the afternoon). I advised that the NSAID should be taken with food. In addition while taking the prescribed NSAID, no over the counter or other NSAIDs should be used, such as ibuprofen (Motrin or Advil) or naproxen (Aleve) as this can cause stomach upset or other side effects. If needed for fever or breakthrough pain Tylenol can be used.  - Last Hep B/C, TB negative 8/2023. Repeat today. - RTC 6 months for follow up.

## 2024-09-17 NOTE — HISTORY OF PRESENT ILLNESS
[FreeTextEntry1] : HISTORY:  80 y/o female presents as follow up for RA.  Pt was diagnosed with RA around 2016 with joint pains in shoulders, ankles, feet. Pt has positive CCP.  Pt was on PDN and MTX in the past but stopped when started on biologics. Pt has been on Actemra for the last 4-5 years.  Pt had R knee patella was removed at age 21 for recurrent knee swelling, which has since resolved with surgery.  Pt has been following with Dr. Courtney and he stopped taking her insurance. Pt's Actemra was stopped about 1 month ago as pt's symptoms not clearly improved with Actemra and pt wanted to see if she can be off medications. Pt has been noticing more pains in the shoulders and R ankle swelling. Pt was seen by another rheumatologist but was told that she was not seropositive.  Pt also follows with hematology for iron overload, s/p phlebotomy x 1.  Pt follows with pain management for back pain and receives multiple injections.   Workup by me showed positive RF and CCP but normal inflammatory markers.  Patient has RA, failed MTX, and has been on Actemra for the last 4-5 years with good control. Pt wonders if she needs to be continued on injectable medications as she is afraid of side effects. Given that MTX did not fully control her symptoms but Actemra has been for the last 4 years, I do feel that she should be continuing on Actemra and not changing medication that is keeping her disease stable.   Pt was restarted on Actemra by me in 8/2022 without any major side effects and good control of her RA. Gabapentin was stopped due to lack of help with pain and made pt feel woozy.  Pt continues on Actemra [id number], celebrex ~1x/day and Advil ~1x/day without RA flares and generally good control of her joint pains.  Pt continues to have significant mechanical low back pain for which she follows with pain management for injections and procedures.   INTERVAL HISTORY:  Pt continues to have good control of RA. Pt only has low back pain for which she continues to follow up with pain management for injections. Pt continues on Actemra [id number], celebrex ~1x/day and Advil ~1x/day, Last labs 6/2024 with normal med safety labs.  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

## 2025-03-12 ENCOUNTER — APPOINTMENT (OUTPATIENT)
Dept: RHEUMATOLOGY | Facility: CLINIC | Age: 80
End: 2025-03-12
Payer: MEDICARE

## 2025-03-12 VITALS
HEART RATE: 62 BPM | BODY MASS INDEX: 29.73 KG/M2 | OXYGEN SATURATION: 97 % | HEIGHT: 66 IN | WEIGHT: 185 LBS | TEMPERATURE: 97.6 F | DIASTOLIC BLOOD PRESSURE: 78 MMHG | SYSTOLIC BLOOD PRESSURE: 119 MMHG

## 2025-03-12 DIAGNOSIS — M17.9 OSTEOARTHRITIS OF KNEE, UNSPECIFIED: ICD-10-CM

## 2025-03-12 DIAGNOSIS — M06.9 RHEUMATOID ARTHRITIS, UNSPECIFIED: ICD-10-CM

## 2025-03-12 DIAGNOSIS — Z51.81 ENCOUNTER FOR THERAPEUTIC DRUG LVL MONITORING: ICD-10-CM

## 2025-03-12 DIAGNOSIS — Z79.620 ENCOUNTER FOR THERAPEUTIC DRUG LVL MONITORING: ICD-10-CM

## 2025-03-12 PROCEDURE — 99214 OFFICE O/P EST MOD 30 MIN: CPT

## 2025-03-12 PROCEDURE — G2211 COMPLEX E/M VISIT ADD ON: CPT

## 2025-04-28 ENCOUNTER — OFFICE (OUTPATIENT)
Dept: URBAN - METROPOLITAN AREA CLINIC 1 | Facility: CLINIC | Age: 80
Setting detail: OPHTHALMOLOGY
End: 2025-04-28
Payer: MEDICARE

## 2025-04-28 DIAGNOSIS — H40.013: ICD-10-CM

## 2025-04-28 DIAGNOSIS — H26.8: ICD-10-CM

## 2025-04-28 DIAGNOSIS — H53.2: ICD-10-CM

## 2025-04-28 DIAGNOSIS — Z96.1: ICD-10-CM

## 2025-04-28 DIAGNOSIS — H35.371: ICD-10-CM

## 2025-04-28 DIAGNOSIS — H18.591: ICD-10-CM

## 2025-04-28 DIAGNOSIS — H26.493: ICD-10-CM

## 2025-04-28 DIAGNOSIS — H43.822: ICD-10-CM

## 2025-04-28 DIAGNOSIS — H16.223: ICD-10-CM

## 2025-04-28 DIAGNOSIS — H18.831: ICD-10-CM

## 2025-04-28 PROBLEM — H43.811 POSTERIOR VITREOUS DETACHMENT ; RIGHT EYE: Status: ACTIVE | Noted: 2025-04-28

## 2025-04-28 PROCEDURE — 99213 OFFICE O/P EST LOW 20 MIN: CPT | Performed by: OPHTHALMOLOGY

## 2025-04-28 PROCEDURE — 92083 EXTENDED VISUAL FIELD XM: CPT | Performed by: OPHTHALMOLOGY

## 2025-04-28 PROCEDURE — 92134 CPTRZ OPH DX IMG PST SGM RTA: CPT | Performed by: OPHTHALMOLOGY

## 2025-04-28 ASSESSMENT — REFRACTION_CURRENTRX
OS_VPRISM_DIRECTION: SV
OS_VPRISM_DIRECTION: SV
OS_AXIS: 048
OD_VPRISM_DIRECTION: SV
OD_CYLINDER: -0.50
OD_AXIS: 119
OD_AXIS: 084
OD_OVR_VA: 20/
OS_CYLINDER: -0.50
OS_OVR_VA: 20/
OD_CYLINDER: -0.50
OD_OVR_VA: 20/
OS_CYLINDER: SPH
OD_SPHERE: -2.00
OS_SPHERE: PLANO
OS_SPHERE: -0.25
OS_OVR_VA: 20/
OD_SPHERE: -2.25
OD_VPRISM_DIRECTION: SV

## 2025-04-28 ASSESSMENT — REFRACTION_AUTOREFRACTION
OS_AXIS: 014
OS_CYLINDER: -0.50
OS_SPHERE: PLANO
OD_AXIS: 090
OD_SPHERE: -2.75
OD_CYLINDER: -0.75

## 2025-04-28 ASSESSMENT — KERATOMETRY
OD_AXISANGLE_DEGREES: 136
OS_K2POWER_DIOPTERS: 43.50
OD_K2POWER_DIOPTERS: 42.75
OS_K1POWER_DIOPTERS: 42.00
OS_AXISANGLE_DEGREES: 084
OD_K1POWER_DIOPTERS: 42.25
METHOD_AUTO_MANUAL: AUTO

## 2025-04-28 ASSESSMENT — REFRACTION_MANIFEST
OS_ADD: +2.50
OS_VA1: 20/NI
OS_VPRISM: BD
OD_VA1: 20/20
OD_HPRISM: 3
OS_CYLINDER: -0.50
OS_AXIS: 076
OS_HPRISM: 3
OD_CYLINDER: -0.50
OD_VA1: 20/20
OS_SPHERE: +0.50
OS_CYLINDER: -0.50
OD_AXIS: 106
OD_CYLINDER: -1.25
OS_VPRISM_DIRECTION: BO
OS_SPHERE: PLANO
OD_VPRISM: BU
OD_AXIS: 100
OD_ADD: +2.50
OS_VA1: 20/50-1
OD_VPRISM_DIRECTION: BO
OD_SPHERE: -2.25
OD_SPHERE: -2.50
OS_AXIS: 50

## 2025-04-28 ASSESSMENT — VISUAL ACUITY
OD_BCVA: 20/30-2
OS_BCVA: 20/20

## 2025-04-28 ASSESSMENT — PACHYMETRY
OS_CT_UM: 577
OS_CT_CORRECTION: -2
OD_CT_CORRECTION: -2
OD_CT_UM: 576

## 2025-04-28 ASSESSMENT — TONOMETRY
OD_IOP_MMHG: 12
OS_IOP_MMHG: 14

## 2025-06-30 ENCOUNTER — APPOINTMENT (OUTPATIENT)
Dept: RHEUMATOLOGY | Facility: CLINIC | Age: 80
End: 2025-06-30
Payer: MEDICARE

## 2025-06-30 VITALS
WEIGHT: 195 LBS | TEMPERATURE: 96.6 F | BODY MASS INDEX: 31.34 KG/M2 | HEART RATE: 66 BPM | SYSTOLIC BLOOD PRESSURE: 105 MMHG | DIASTOLIC BLOOD PRESSURE: 72 MMHG | HEIGHT: 66 IN | OXYGEN SATURATION: 98 %

## 2025-06-30 PROCEDURE — 99214 OFFICE O/P EST MOD 30 MIN: CPT

## 2025-06-30 PROCEDURE — G2211 COMPLEX E/M VISIT ADD ON: CPT

## 2025-06-30 RX ORDER — METHYLPREDNISOLONE 4 MG/1
4 TABLET ORAL
Qty: 1 | Refills: 5 | Status: ACTIVE | COMMUNITY
Start: 2025-06-30 | End: 1900-01-01